# Patient Record
Sex: MALE | Race: WHITE | NOT HISPANIC OR LATINO | Employment: STUDENT | URBAN - METROPOLITAN AREA
[De-identification: names, ages, dates, MRNs, and addresses within clinical notes are randomized per-mention and may not be internally consistent; named-entity substitution may affect disease eponyms.]

---

## 2023-04-04 ENCOUNTER — OFFICE VISIT (OUTPATIENT)
Dept: FAMILY MEDICINE | Facility: CLINIC | Age: 18
End: 2023-04-04
Payer: MEDICAID

## 2023-04-04 VITALS
HEIGHT: 72 IN | HEART RATE: 72 BPM | TEMPERATURE: 96.6 F | BODY MASS INDEX: 22.08 KG/M2 | DIASTOLIC BLOOD PRESSURE: 64 MMHG | SYSTOLIC BLOOD PRESSURE: 104 MMHG | OXYGEN SATURATION: 99 % | RESPIRATION RATE: 20 BRPM | WEIGHT: 163 LBS

## 2023-04-04 DIAGNOSIS — H61.23 BILATERAL IMPACTED CERUMEN: ICD-10-CM

## 2023-04-04 DIAGNOSIS — H10.32 ACUTE BACTERIAL CONJUNCTIVITIS OF LEFT EYE: Primary | ICD-10-CM

## 2023-04-04 PROCEDURE — 99203 OFFICE O/P NEW LOW 30 MIN: CPT | Performed by: PHYSICIAN ASSISTANT

## 2023-04-04 RX ORDER — OXYCODONE HYDROCHLORIDE 5 MG/1
TABLET ORAL
COMMUNITY
Start: 2023-02-11 | End: 2023-04-04

## 2023-04-04 RX ORDER — TOBRAMYCIN 3 MG/ML
1-2 SOLUTION/ DROPS OPHTHALMIC 4 TIMES DAILY
Qty: 5 ML | Refills: 0 | Status: SHIPPED | OUTPATIENT
Start: 2023-04-04 | End: 2023-04-11

## 2023-04-04 ASSESSMENT — ENCOUNTER SYMPTOMS
EYE DISCHARGE: 1
SORE THROAT: 0
CONSTITUTIONAL NEGATIVE: 1
EYE PAIN: 1
RESPIRATORY NEGATIVE: 1
GASTROINTESTINAL NEGATIVE: 1

## 2023-04-04 NOTE — PROGRESS NOTES
"  1. Acute bacterial conjunctivitis of left eye  Will treat with tobramycin eye drops for a week, good handwashing    - tobramycin (TOBREX) 0.3 % ophthalmic solution; Place 1-2 drops Into the left eye 4 times daily for 7 days  Dispense: 5 mL; Refill: 0    2. Bilateral impacted cerumen  resolved      Subjective   Michael is a 17 year old, presenting for the following health issues:  Eye Problem (New Pt c/o watery left eye and discharge that started last night)         View : No data to display.              Eye Problem   Associated symptoms include discharge (left).   History of Present Illness       Reason for visit:  My eye    He eats 2-3 servings of fruits and vegetables daily.He consumes 3 sweetened beverage(s) daily.He exercises with enough effort to increase his heart rate 60 or more minutes per day.  He exercises with enough effort to increase his heart rate 6 days per week.   He is taking medications regularly.             Left eye discharge and mattering, vision is not affective  Right eye is normal  He does not wear contacts      Review of Systems   Constitutional: Negative.    HENT: Positive for congestion. Negative for sore throat.    Eyes: Positive for pain and discharge (left).   Respiratory: Negative.    Gastrointestinal: Negative.             Objective    /64 (BP Location: Right arm, Patient Position: Sitting, Cuff Size: Adult Regular)   Pulse 72   Temp (!) 96.6  F (35.9  C) (Tympanic)   Resp 20   Ht 1.835 m (6' 0.25\")   Wt 73.9 kg (163 lb)   SpO2 99%   BMI 21.95 kg/m    Body mass index is 21.95 kg/m .  Physical Exam  Vitals reviewed.   Constitutional:       Appearance: Normal appearance.   HENT:      Head:      Comments: No sinus tenderness     Ears:      Comments: Bilateral impacted cerumen, flushed with water and alcohol, afterwards TMs and canals are normal     Mouth/Throat:      Mouth: Mucous membranes are moist.      Pharynx: No oropharyngeal exudate or posterior oropharyngeal " erythema.   Eyes:      Extraocular Movements: Extraocular movements intact.      Pupils: Pupils are equal, round, and reactive to light.      Comments: Left sclera and conjunctiva are moderately injected, no mattering  No injection in right eye   Cardiovascular:      Rate and Rhythm: Normal rate and regular rhythm.      Pulses: Normal pulses.      Heart sounds: Normal heart sounds.   Pulmonary:      Effort: Pulmonary effort is normal.      Breath sounds: Normal breath sounds.   Musculoskeletal:      Cervical back: Normal range of motion. No tenderness.   Lymphadenopathy:      Cervical: No cervical adenopathy.   Neurological:      Mental Status: He is alert.